# Patient Record
Sex: FEMALE | Race: WHITE | NOT HISPANIC OR LATINO | Employment: OTHER | URBAN - NONMETROPOLITAN AREA
[De-identification: names, ages, dates, MRNs, and addresses within clinical notes are randomized per-mention and may not be internally consistent; named-entity substitution may affect disease eponyms.]

---

## 2018-03-09 ENCOUNTER — TRANSCRIBE ORDERS (OUTPATIENT)
Dept: CARDIOLOGY | Facility: CLINIC | Age: 63
End: 2018-03-09

## 2018-03-09 DIAGNOSIS — I10 HYPERTENSION, UNSPECIFIED TYPE: ICD-10-CM

## 2018-03-09 DIAGNOSIS — R06.00 DYSPNEA, UNSPECIFIED TYPE: Primary | ICD-10-CM

## 2018-03-12 ENCOUNTER — CONSULT (OUTPATIENT)
Dept: CARDIOLOGY | Facility: CLINIC | Age: 63
End: 2018-03-12

## 2018-03-12 VITALS
SYSTOLIC BLOOD PRESSURE: 140 MMHG | HEIGHT: 66 IN | DIASTOLIC BLOOD PRESSURE: 90 MMHG | BODY MASS INDEX: 33.27 KG/M2 | WEIGHT: 207 LBS | HEART RATE: 80 BPM

## 2018-03-12 DIAGNOSIS — E78.00 HYPERCHOLESTEREMIA: ICD-10-CM

## 2018-03-12 DIAGNOSIS — R01.1 MURMUR, CARDIAC: ICD-10-CM

## 2018-03-12 DIAGNOSIS — R00.2 PALPITATIONS: ICD-10-CM

## 2018-03-12 DIAGNOSIS — I10 ESSENTIAL HYPERTENSION: ICD-10-CM

## 2018-03-12 DIAGNOSIS — R06.02 SHORTNESS OF BREATH: ICD-10-CM

## 2018-03-12 DIAGNOSIS — R07.2 PRECORDIAL PAIN: Primary | ICD-10-CM

## 2018-03-12 PROCEDURE — 99204 OFFICE O/P NEW MOD 45 MIN: CPT | Performed by: INTERNAL MEDICINE

## 2018-03-12 PROCEDURE — 93000 ELECTROCARDIOGRAM COMPLETE: CPT | Performed by: INTERNAL MEDICINE

## 2018-03-12 RX ORDER — LOSARTAN POTASSIUM 100 MG/1
100 TABLET ORAL DAILY
COMMUNITY
End: 2022-06-01

## 2018-03-12 RX ORDER — PANTOPRAZOLE SODIUM 40 MG/1
40 TABLET, DELAYED RELEASE ORAL DAILY
COMMUNITY

## 2018-03-12 RX ORDER — TRIAMTERENE AND HYDROCHLOROTHIAZIDE 37.5; 25 MG/1; MG/1
1 TABLET ORAL DAILY
COMMUNITY

## 2018-03-12 RX ORDER — GABAPENTIN 100 MG/1
100 CAPSULE ORAL NIGHTLY
Status: ON HOLD | COMMUNITY
End: 2022-05-12

## 2018-03-12 RX ORDER — AMLODIPINE BESYLATE 5 MG/1
5 TABLET ORAL DAILY
COMMUNITY
End: 2018-03-12

## 2018-03-12 RX ORDER — ONDANSETRON 4 MG/1
4 TABLET, FILM COATED ORAL EVERY 6 HOURS PRN
COMMUNITY

## 2018-03-12 RX ORDER — TRAMADOL HYDROCHLORIDE 50 MG/1
50 TABLET ORAL 3 TIMES DAILY
Status: ON HOLD | COMMUNITY
End: 2022-05-12

## 2018-03-12 RX ORDER — METOPROLOL TARTRATE 50 MG/1
50 TABLET, FILM COATED ORAL 2 TIMES DAILY
Qty: 60 TABLET | Refills: 11 | Status: SHIPPED | OUTPATIENT
Start: 2018-03-12

## 2018-03-12 RX ORDER — ERGOCALCIFEROL 1.25 MG/1
50000 CAPSULE ORAL WEEKLY
COMMUNITY

## 2018-03-12 RX ORDER — LORATADINE 10 MG/1
10 TABLET ORAL DAILY
COMMUNITY

## 2018-03-12 NOTE — PROGRESS NOTES
CARDIAC COMPLAINTS  chest pressure/discomfort, dyspnea and palpitations      Subjective   Raúl Vicente is a 62 y.o. female came in today for her initial cardiac evaluation.  She has history of hypertension, hypercholesterolemia was been having symptoms of palpitation for many years.  Few weeks ago, she was outside in her yard when she fell on her right side.  She doesn't think that she had any syncope.  She is not sure why she fell, whether she tripped on something or did she feel dizzy.  Since then, she has been noticing left-sided chest pain radiating to the left shoulder.  This pain is intermittent, occurs anytime of the day without any precipitating factors.  She also had history of pneumonia few months ago and since then noticed increasing shortness of breath.  Regarding the palpitation it is more of a skipped beating.  She also complain of having history of blood clots behind her left knee in the past.  Her lab work showed that the LDL is 113 and the TSH is normal.  She quit smoking in 1976 and heart disease runs in the family.    No past surgical history on file.    Current Outpatient Prescriptions   Medication Sig Dispense Refill   • FENOFIBRATE PO Take 160 mg by mouth Daily.     • fluticasone (FLOVENT DISKUS) 50 MCG/BLIST diskus inhaler Inhale 1 puff 2 (Two) Times a Day.     • gabapentin (NEURONTIN) 100 MG capsule Take 100 mg by mouth Every Night.     • loratadine (CLARITIN) 10 MG tablet Take 10 mg by mouth Daily.     • losartan (COZAAR) 100 MG tablet Take 100 mg by mouth Daily.     • mometasone-formoterol (DULERA 100) 100-5 MCG/ACT inhaler Inhale 2 puffs 2 (Two) Times a Day.     • ondansetron (ZOFRAN) 4 MG tablet Take 4 mg by mouth Every 6 (Six) Hours As Needed for Nausea or Vomiting.     • pantoprazole (PROTONIX) 40 MG EC tablet Take 40 mg by mouth Daily.     • traMADol (ULTRAM) 50 MG tablet Take 50 mg by mouth 3 (Three) Times a Day.     • triamterene-hydrochlorothiazide (MAXZIDE-25) 37.5-25 MG per  tablet Take 1 tablet by mouth Daily.     • vitamin D (ERGOCALCIFEROL) 80975 units capsule capsule Take 50,000 Units by mouth 1 (One) Time Per Week.     • metoprolol tartrate (LOPRESSOR) 50 MG tablet Take 1 tablet by mouth 2 (Two) Times a Day. 60 tablet 11     No current facility-administered medications for this visit.            ALLERGIES:  Demerol [meperidine] and Sulfa antibiotics    Past Medical History:   Diagnosis Date   • Asthma    • Cervical disc syndrome    • Chronic kidney disease     follows with Dr De Los Santos   • Deep vein thrombosis 2016   • Depression    • DJD (degenerative joint disease)    • History of repair of hiatal hernia 2016   • Hx of hysterectomy    • Hx of lumpectomy    • Hyperlipidemia    • Hypertension    • PUD (peptic ulcer disease)        History   Smoking Status   • Former Smoker   • Quit date: 3/12/1976   Smokeless Tobacco   • Never Used          Family History   Problem Relation Age of Onset   • No Known Problems Mother    • Heart disease Father    • Heart disease Brother    • Heart disease Maternal Grandmother    • Hypertension Son        Review of Systems   Constitution: Positive for malaise/fatigue. Negative for decreased appetite.   HENT: Negative for congestion and sore throat.    Eyes: Negative for blurred vision.   Cardiovascular: Positive for dyspnea on exertion and palpitations. Negative for chest pain.   Respiratory: Positive for shortness of breath. Negative for snoring.    Endocrine: Negative for cold intolerance and heat intolerance.   Hematologic/Lymphatic: Negative for adenopathy. Does not bruise/bleed easily.   Skin: Negative for itching, nail changes and skin cancer.   Musculoskeletal: Positive for arthritis and joint pain. Negative for myalgias.   Gastrointestinal: Negative for abdominal pain, dysphagia and heartburn.   Genitourinary: Negative for bladder incontinence and frequency.   Neurological: Negative for dizziness, light-headedness, seizures and vertigo.  "  Psychiatric/Behavioral: Negative for altered mental status.   Allergic/Immunologic: Negative for environmental allergies and hives.       Diabetes- No  Thyroid- normal    Objective     /90 (BP Location: Right arm)   Pulse 80   Ht 167.6 cm (66\")   Wt 93.9 kg (207 lb)   BMI 33.41 kg/m²     Physical Exam   Constitutional: She is oriented to person, place, and time. She appears well-developed and well-nourished.   HENT:   Head: Normocephalic.   Nose: Nose normal.   Eyes: EOM are normal. Pupils are equal, round, and reactive to light.   Neck: Normal range of motion. Neck supple.   Cardiovascular: Normal rate, regular rhythm, S1 normal and S2 normal.   Occasional extrasystoles are present.   No murmur heard.  Pulmonary/Chest: Effort normal and breath sounds normal.   Abdominal: Soft. Bowel sounds are normal.   Musculoskeletal: Normal range of motion. She exhibits no edema.   Neurological: She is alert and oriented to person, place, and time.   Skin: Skin is warm and dry.   Psychiatric: She has a normal mood and affect.         ECG 12 Lead  Date/Time: 3/12/2018 12:34 PM  Performed by: CONNER BRAVO  Authorized by: CONNER BRAVO   Previous ECG: no previous ECG available  Rhythm: sinus rhythm  Ectopy: PVCs  Rate: normal  QRS axis: normal  Clinical impression: non-specific ECG              Assessment/Plan     Raúl was seen today for establish care, chest pain, shortness of breath, palpitations and irregular heart beat.    Diagnoses and all orders for this visit:    Precordial pain  -     Treadmill Stress Test; Future    Essential hypertension  -     metoprolol tartrate (LOPRESSOR) 50 MG tablet; Take 1 tablet by mouth 2 (Two) Times a Day.  -     Treadmill Stress Test; Future    Hypercholesteremia    Shortness of breath  -     Adult Transthoracic Echo Complete W/ Cont if Necessary Per Protocol; Future    Palpitations  -     metoprolol tartrate (LOPRESSOR) 50 MG tablet; Take 1 tablet by mouth 2 (Two) " Times a Day.    Murmur, cardiac  -     Adult Transthoracic Echo Complete W/ Cont if Necessary Per Protocol; Future     At baseline her heart rate is stable but slightly irregular.  Blood pressure is mildly elevated.  Her BMI is 33.  Her EKG showed sinus rhythm with occasional PVC's.  Her clinical examination is unremarkable.  She does have some mild restriction of the movement in the left shoulder.  I did not notice any tenderness.  I started her on Lopressor 50 mg twice a day for her blood pressure and palpitation.  I talked to her about diet and weight reduction.  I scheduled her to undergo an echocardiogram to evaluate the LV function and the valvular structures.  I also scheduled her to undergo a regular stress test to evaluate the functional status, chronotropic response and to look for any stress-induced arrhythmia.  Based on the results of these tests, further recommendations will be made.               Electronically signed by Daovn Luther MD March 12, 2018 12:27 PM

## 2018-03-14 ENCOUNTER — HOSPITAL ENCOUNTER (OUTPATIENT)
Dept: CARDIOLOGY | Facility: HOSPITAL | Age: 63
Discharge: HOME | End: 2018-03-14
Attending: INTERNAL MEDICINE

## 2018-03-14 ENCOUNTER — HOSPITAL ENCOUNTER (OUTPATIENT)
Dept: CARDIOLOGY | Facility: HOSPITAL | Age: 63
Discharge: HOME | End: 2018-03-14
Attending: INTERNAL MEDICINE | Admitting: INTERNAL MEDICINE

## 2018-03-14 ENCOUNTER — OUTSIDE FACILITY SERVICE (OUTPATIENT)
Dept: CARDIOLOGY | Facility: CLINIC | Age: 63
End: 2018-03-14

## 2018-03-14 DIAGNOSIS — R06.02 SHORTNESS OF BREATH: ICD-10-CM

## 2018-03-14 DIAGNOSIS — I10 ESSENTIAL HYPERTENSION: ICD-10-CM

## 2018-03-14 DIAGNOSIS — R07.2 PRECORDIAL PAIN: ICD-10-CM

## 2018-03-14 DIAGNOSIS — R01.1 MURMUR, CARDIAC: ICD-10-CM

## 2018-03-14 LAB
MAXIMAL PREDICTED HEART RATE: 159 BPM
MAXIMAL PREDICTED HEART RATE: 159 BPM
STRESS TARGET HR: 135 BPM
STRESS TARGET HR: 135 BPM

## 2018-03-14 PROCEDURE — 93306 TTE W/DOPPLER COMPLETE: CPT

## 2018-03-14 PROCEDURE — 93306 TTE W/DOPPLER COMPLETE: CPT | Performed by: INTERNAL MEDICINE

## 2018-03-14 PROCEDURE — 93017 CV STRESS TEST TRACING ONLY: CPT

## 2018-03-14 PROCEDURE — 93018 CV STRESS TEST I&R ONLY: CPT | Performed by: INTERNAL MEDICINE

## 2018-03-16 ENCOUNTER — TELEPHONE (OUTPATIENT)
Dept: CARDIOLOGY | Facility: CLINIC | Age: 63
End: 2018-03-16

## 2018-03-20 DIAGNOSIS — R07.2 PRECORDIAL PAIN: Primary | ICD-10-CM

## 2018-03-20 NOTE — TELEPHONE ENCOUNTER
Patient's daughter, Jenifer aware of regular stress test and echo results and recommendations.    Inconclusive regular stress, recommendations may need nuclear stress test.    Patient is willing to proceed with nuclear stress test.  Can you place order please.

## 2018-03-27 ENCOUNTER — HOSPITAL ENCOUNTER (OUTPATIENT)
Dept: CARDIOLOGY | Facility: HOSPITAL | Age: 63
Discharge: HOME | End: 2018-03-27
Attending: INTERNAL MEDICINE

## 2018-03-27 ENCOUNTER — OUTSIDE FACILITY SERVICE (OUTPATIENT)
Dept: CARDIOLOGY | Facility: CLINIC | Age: 63
End: 2018-03-27

## 2018-03-27 DIAGNOSIS — R07.2 PRECORDIAL PAIN: ICD-10-CM

## 2018-03-27 LAB
MAXIMAL PREDICTED HEART RATE: 159 BPM
STRESS TARGET HR: 135 BPM

## 2018-03-27 PROCEDURE — 78452 HT MUSCLE IMAGE SPECT MULT: CPT | Performed by: INTERNAL MEDICINE

## 2018-03-27 PROCEDURE — 93017 CV STRESS TEST TRACING ONLY: CPT

## 2018-03-27 PROCEDURE — 78452 HT MUSCLE IMAGE SPECT MULT: CPT

## 2018-03-27 PROCEDURE — 93018 CV STRESS TEST I&R ONLY: CPT | Performed by: INTERNAL MEDICINE

## 2018-03-27 PROCEDURE — 25010000002 REGADENOSON 0.4 MG/5ML SOLUTION: Performed by: INTERNAL MEDICINE

## 2018-03-27 PROCEDURE — 0 TECHNETIUM SESTAMIBI: Performed by: INTERNAL MEDICINE

## 2018-03-27 PROCEDURE — A9500 TC99M SESTAMIBI: HCPCS | Performed by: INTERNAL MEDICINE

## 2018-03-27 RX ADMIN — TECHNETIUM TC 99M SESTAMIBI 1 DOSE: 1 INJECTION INTRAVENOUS at 09:30

## 2018-03-27 RX ADMIN — REGADENOSON 0.4 MG: 0.08 INJECTION, SOLUTION INTRAVENOUS at 09:30

## 2018-03-29 ENCOUNTER — TELEPHONE (OUTPATIENT)
Dept: CARDIOLOGY | Facility: CLINIC | Age: 63
End: 2018-03-29

## 2018-03-29 NOTE — TELEPHONE ENCOUNTER
Patient aware of stress test results and recommendations.  No obvious ischemia see, normal LV function.  Continue home medications.

## 2022-04-07 ENCOUNTER — TRANSCRIBE ORDERS (OUTPATIENT)
Dept: ADMINISTRATIVE | Facility: HOSPITAL | Age: 67
End: 2022-04-07

## 2022-04-07 DIAGNOSIS — R10.9 STOMACH ACHE: Primary | ICD-10-CM

## 2022-04-20 ENCOUNTER — HOSPITAL ENCOUNTER (OUTPATIENT)
Dept: ULTRASOUND IMAGING | Facility: HOSPITAL | Age: 67
Discharge: HOME | End: 2022-04-20
Admitting: NURSE PRACTITIONER

## 2022-04-20 DIAGNOSIS — R10.9 STOMACH ACHE: ICD-10-CM

## 2022-04-20 PROCEDURE — 76775 US EXAM ABDO BACK WALL LIM: CPT

## 2022-04-20 PROCEDURE — 76775 US EXAM ABDO BACK WALL LIM: CPT | Performed by: RADIOLOGY

## 2022-05-02 ENCOUNTER — OFFICE VISIT (OUTPATIENT)
Dept: SURGERY | Facility: CLINIC | Age: 67
End: 2022-05-02

## 2022-05-02 VITALS — BODY MASS INDEX: 32.62 KG/M2 | HEIGHT: 66 IN | WEIGHT: 203 LBS

## 2022-05-02 DIAGNOSIS — R10.12 LUQ ABDOMINAL PAIN: ICD-10-CM

## 2022-05-02 DIAGNOSIS — K80.20 GALLSTONES: Primary | ICD-10-CM

## 2022-05-02 DIAGNOSIS — R31.0 GROSS HEMATURIA: ICD-10-CM

## 2022-05-02 PROCEDURE — 99204 OFFICE O/P NEW MOD 45 MIN: CPT | Performed by: SURGERY

## 2022-05-02 RX ORDER — APIXABAN 5 MG/1
5 TABLET, FILM COATED ORAL 2 TIMES DAILY
COMMUNITY
Start: 2022-04-18

## 2022-05-02 RX ORDER — PEG-3350, SODIUM SULFATE, SODIUM CHLORIDE, POTASSIUM CHLORIDE, SODIUM ASCORBATE AND ASCORBIC ACID 7.5-2.691G
1000 KIT ORAL EVERY 12 HOURS
Qty: 4000 ML | Refills: 0 | Status: SHIPPED | OUTPATIENT
Start: 2022-05-02 | End: 2022-06-01

## 2022-05-02 NOTE — PROGRESS NOTES
Subjective   Raúl Vicente is a 66 y.o. female.     Chief Complaint: gallstones    History of Present Illness She is a 65 yo who had some abdominal pain in the LUQ and some blood in the urine at times. A US shows gallstones. No bowel symptoms, but she had polyps on a colonoscopy 6-7 years ago.     The following portions of the patient's history were reviewed and updated as appropriate: current medications, past family history, past medical history, past social history, past surgical history and problem list.    Review of Systems   Constitutional: Negative for activity change, appetite change, chills, fever and unexpected weight change.   HENT: Negative for congestion, facial swelling and sore throat.    Eyes: Negative for photophobia and visual disturbance.   Respiratory: Negative for chest tightness, shortness of breath and wheezing.    Cardiovascular: Negative for chest pain, palpitations and leg swelling.   Gastrointestinal: Positive for abdominal pain. Negative for abdominal distention, anal bleeding, blood in stool, constipation, diarrhea, nausea, rectal pain and vomiting.   Endocrine: Negative for cold intolerance, heat intolerance, polydipsia and polyuria.   Genitourinary: Positive for flank pain and hematuria. Negative for difficulty urinating, dysuria and urgency.   Musculoskeletal: Negative for back pain and myalgias.   Skin: Negative for rash and wound.   Allergic/Immunologic: Negative for immunocompromised state.   Neurological: Negative for dizziness, seizures, syncope, light-headedness, numbness and headaches.   Hematological: Negative for adenopathy. Does not bruise/bleed easily.   Psychiatric/Behavioral: Negative for behavioral problems and confusion. The patient is not nervous/anxious.        Objective   Physical Exam  Vitals reviewed.   Constitutional:       General: She is not in acute distress.     Appearance: She is well-developed. She is not ill-appearing.   HENT:      Head: Normocephalic. No  laceration. Hair is normal.      Right Ear: Hearing and ear canal normal.      Left Ear: Hearing and ear canal normal.      Nose: Nose normal.      Right Sinus: No maxillary sinus tenderness or frontal sinus tenderness.      Left Sinus: No maxillary sinus tenderness or frontal sinus tenderness.   Eyes:      General: Lids are normal.      Conjunctiva/sclera: Conjunctivae normal.      Pupils: Pupils are equal, round, and reactive to light.   Neck:      Thyroid: No thyroid mass or thyromegaly.      Vascular: No JVD.      Trachea: No tracheal tenderness or tracheal deviation.   Cardiovascular:      Rate and Rhythm: Normal rate and regular rhythm.      Heart sounds: No murmur heard.    No gallop.   Pulmonary:      Effort: Pulmonary effort is normal.      Breath sounds: Normal breath sounds. No stridor. No wheezing.   Chest:      Chest wall: No tenderness.   Breasts:      Right: No supraclavicular adenopathy.      Left: No supraclavicular adenopathy.       Abdominal:      General: Bowel sounds are normal. There is no distension.      Palpations: Abdomen is soft. There is no mass.      Tenderness: There is no abdominal tenderness. There is no guarding or rebound.      Hernia: No hernia is present.   Musculoskeletal:         General: No deformity.      Cervical back: Normal range of motion.   Lymphadenopathy:      Cervical: No cervical adenopathy.      Upper Body:      Right upper body: No supraclavicular adenopathy.      Left upper body: No supraclavicular adenopathy.   Skin:     General: Skin is warm and dry.      Coloration: Skin is not pale.      Findings: No erythema or rash.   Neurological:      Mental Status: She is alert and oriented to person, place, and time.      Motor: No abnormal muscle tone.   Psychiatric:         Behavior: Behavior normal.         Thought Content: Thought content normal.         Past Medical History:   Diagnosis Date   • Asthma    • Cervical disc syndrome    • Chronic kidney disease      follows with Dr De Los Santos   • Deep vein thrombosis (HCC) 2016   • Depression    • DJD (degenerative joint disease)    • History of repair of hiatal hernia 2016   • Hx of hysterectomy    • Hx of lumpectomy    • Hyperlipidemia    • Hypertension    • PUD (peptic ulcer disease)        Family History   Problem Relation Age of Onset   • No Known Problems Mother    • Heart disease Father    • Heart disease Brother    • Heart disease Maternal Grandmother    • Hypertension Son        Social History     Tobacco Use   • Smoking status: Former Smoker     Quit date: 3/12/1976     Years since quittin.1   • Smokeless tobacco: Never Used   Vaping Use   • Vaping Use: Never used   Substance Use Topics   • Alcohol use: No   • Drug use: No       Past Surgical History:   Procedure Laterality Date   • BREAST LUMPECTOMY     • CARDIOVASCULAR STRESS TEST  2018    R. Stress- 6 Min, 59 Secs. 5.2 METS. 64% THR. BP- 154/61. Inconclusive.   • CARDIOVASCULAR STRESS TEST  2018    L. Cardiolite- Negative. Breast attenuation   • ECHO - CONVERTED  2018    EF 65%. RVSP- 21 mmHg   • HERNIA REPAIR     • HYSTERECTOMY         Current Outpatient Medications   Medication Instructions   • Eliquis 5 mg, Oral, 2 Times Daily   • FENOFIBRATE  mg, Oral, Daily   • fluticasone (FLOVENT DISKUS) 50 MCG/BLIST diskus inhaler 1 puff, Inhalation, 2 Times Daily - RT   • gabapentin (NEURONTIN) 100 mg, Oral, Nightly   • loratadine (CLARITIN) 10 mg, Oral, Daily   • losartan (COZAAR) 100 mg, Oral, Daily   • metoprolol tartrate (LOPRESSOR) 50 mg, Oral, 2 Times Daily   • mometasone-formoterol (DULERA 100) 100-5 MCG/ACT inhaler 2 puffs, Inhalation, 2 Times Daily - RT   • ondansetron (ZOFRAN) 4 mg, Oral, Every 6 Hours PRN   • pantoprazole (PROTONIX) 40 mg, Oral, Daily   • traMADol (ULTRAM) 50 mg, Oral, 3 Times Daily   • triamterene-hydrochlorothiazide (MAXZIDE-25) 37.5-25 MG per tablet 1 tablet, Oral, Daily   • vitamin D (ERGOCALCIFEROL) 50,000 Units,  Oral, Weekly         Assessment/Plan   Diagnoses and all orders for this visit:    1. Gallstones (Primary)    2. LUQ abdominal pain    3. Gross hematuria    She will need a CT scan of the abdomen and colonoscopy as I do not think her symptoms are from the gallstones.

## 2022-05-02 NOTE — H&P (VIEW-ONLY)
Subjective   Raúl Vicente is a 66 y.o. female.     Chief Complaint: gallstones    History of Present Illness She is a 67 yo who had some abdominal pain in the LUQ and some blood in the urine at times. A US shows gallstones. No bowel symptoms, but she had polyps on a colonoscopy 6-7 years ago.     The following portions of the patient's history were reviewed and updated as appropriate: current medications, past family history, past medical history, past social history, past surgical history and problem list.    Review of Systems   Constitutional: Negative for activity change, appetite change, chills, fever and unexpected weight change.   HENT: Negative for congestion, facial swelling and sore throat.    Eyes: Negative for photophobia and visual disturbance.   Respiratory: Negative for chest tightness, shortness of breath and wheezing.    Cardiovascular: Negative for chest pain, palpitations and leg swelling.   Gastrointestinal: Positive for abdominal pain. Negative for abdominal distention, anal bleeding, blood in stool, constipation, diarrhea, nausea, rectal pain and vomiting.   Endocrine: Negative for cold intolerance, heat intolerance, polydipsia and polyuria.   Genitourinary: Positive for flank pain and hematuria. Negative for difficulty urinating, dysuria and urgency.   Musculoskeletal: Negative for back pain and myalgias.   Skin: Negative for rash and wound.   Allergic/Immunologic: Negative for immunocompromised state.   Neurological: Negative for dizziness, seizures, syncope, light-headedness, numbness and headaches.   Hematological: Negative for adenopathy. Does not bruise/bleed easily.   Psychiatric/Behavioral: Negative for behavioral problems and confusion. The patient is not nervous/anxious.        Objective   Physical Exam  Vitals reviewed.   Constitutional:       General: She is not in acute distress.     Appearance: She is well-developed. She is not ill-appearing.   HENT:      Head: Normocephalic. No  laceration. Hair is normal.      Right Ear: Hearing and ear canal normal.      Left Ear: Hearing and ear canal normal.      Nose: Nose normal.      Right Sinus: No maxillary sinus tenderness or frontal sinus tenderness.      Left Sinus: No maxillary sinus tenderness or frontal sinus tenderness.   Eyes:      General: Lids are normal.      Conjunctiva/sclera: Conjunctivae normal.      Pupils: Pupils are equal, round, and reactive to light.   Neck:      Thyroid: No thyroid mass or thyromegaly.      Vascular: No JVD.      Trachea: No tracheal tenderness or tracheal deviation.   Cardiovascular:      Rate and Rhythm: Normal rate and regular rhythm.      Heart sounds: No murmur heard.    No gallop.   Pulmonary:      Effort: Pulmonary effort is normal.      Breath sounds: Normal breath sounds. No stridor. No wheezing.   Chest:      Chest wall: No tenderness.   Breasts:      Right: No supraclavicular adenopathy.      Left: No supraclavicular adenopathy.       Abdominal:      General: Bowel sounds are normal. There is no distension.      Palpations: Abdomen is soft. There is no mass.      Tenderness: There is no abdominal tenderness. There is no guarding or rebound.      Hernia: No hernia is present.   Musculoskeletal:         General: No deformity.      Cervical back: Normal range of motion.   Lymphadenopathy:      Cervical: No cervical adenopathy.      Upper Body:      Right upper body: No supraclavicular adenopathy.      Left upper body: No supraclavicular adenopathy.   Skin:     General: Skin is warm and dry.      Coloration: Skin is not pale.      Findings: No erythema or rash.   Neurological:      Mental Status: She is alert and oriented to person, place, and time.      Motor: No abnormal muscle tone.   Psychiatric:         Behavior: Behavior normal.         Thought Content: Thought content normal.         Past Medical History:   Diagnosis Date   • Asthma    • Cervical disc syndrome    • Chronic kidney disease      follows with Dr De Los Santos   • Deep vein thrombosis (HCC) 2016   • Depression    • DJD (degenerative joint disease)    • History of repair of hiatal hernia 2016   • Hx of hysterectomy    • Hx of lumpectomy    • Hyperlipidemia    • Hypertension    • PUD (peptic ulcer disease)        Family History   Problem Relation Age of Onset   • No Known Problems Mother    • Heart disease Father    • Heart disease Brother    • Heart disease Maternal Grandmother    • Hypertension Son        Social History     Tobacco Use   • Smoking status: Former Smoker     Quit date: 3/12/1976     Years since quittin.1   • Smokeless tobacco: Never Used   Vaping Use   • Vaping Use: Never used   Substance Use Topics   • Alcohol use: No   • Drug use: No       Past Surgical History:   Procedure Laterality Date   • BREAST LUMPECTOMY     • CARDIOVASCULAR STRESS TEST  2018    R. Stress- 6 Min, 59 Secs. 5.2 METS. 64% THR. BP- 154/61. Inconclusive.   • CARDIOVASCULAR STRESS TEST  2018    L. Cardiolite- Negative. Breast attenuation   • ECHO - CONVERTED  2018    EF 65%. RVSP- 21 mmHg   • HERNIA REPAIR     • HYSTERECTOMY         Current Outpatient Medications   Medication Instructions   • Eliquis 5 mg, Oral, 2 Times Daily   • FENOFIBRATE  mg, Oral, Daily   • fluticasone (FLOVENT DISKUS) 50 MCG/BLIST diskus inhaler 1 puff, Inhalation, 2 Times Daily - RT   • gabapentin (NEURONTIN) 100 mg, Oral, Nightly   • loratadine (CLARITIN) 10 mg, Oral, Daily   • losartan (COZAAR) 100 mg, Oral, Daily   • metoprolol tartrate (LOPRESSOR) 50 mg, Oral, 2 Times Daily   • mometasone-formoterol (DULERA 100) 100-5 MCG/ACT inhaler 2 puffs, Inhalation, 2 Times Daily - RT   • ondansetron (ZOFRAN) 4 mg, Oral, Every 6 Hours PRN   • pantoprazole (PROTONIX) 40 mg, Oral, Daily   • traMADol (ULTRAM) 50 mg, Oral, 3 Times Daily   • triamterene-hydrochlorothiazide (MAXZIDE-25) 37.5-25 MG per tablet 1 tablet, Oral, Daily   • vitamin D (ERGOCALCIFEROL) 50,000 Units,  Oral, Weekly         Assessment/Plan   Diagnoses and all orders for this visit:    1. Gallstones (Primary)    2. LUQ abdominal pain    3. Gross hematuria    She will need a CT scan of the abdomen and colonoscopy as I do not think her symptoms are from the gallstones.

## 2022-05-09 ENCOUNTER — TELEPHONE (OUTPATIENT)
Dept: SURGERY | Facility: CLINIC | Age: 67
End: 2022-05-09

## 2022-05-09 ENCOUNTER — PREP FOR SURGERY (OUTPATIENT)
Dept: OTHER | Facility: HOSPITAL | Age: 67
End: 2022-05-09

## 2022-05-09 DIAGNOSIS — R10.12 LEFT UPPER QUADRANT ABDOMINAL PAIN: Primary | ICD-10-CM

## 2022-05-09 NOTE — TELEPHONE ENCOUNTER
[facility] Cov[facility] test scheduled Tuesday between the hours of 8 am - 4 pm (follow the orange arrows on hospital signs to testing site). Covid test MUST be done here, no outside test accepted!  Patient to follow clear liquid diet all day on Wednesday from the time they wake up until midnight. Diet list given to patient upon check out. Patient will also begin drinking bowel prep that evening beginning around 6 pm. Bowel prep directions provided at check out along with clear liquid diet list.   Patient needs to be NPO for surgery (no food or drinks after midnight the night before surgery or nothing morning of surgery). Patient also is required to have a  accompany them on procedure day. This person must remain on campus at all times either inside the hospital or in their car. They cannot drop you off and pick you up.  Procedure scheduled for Thursday 5/12 @ 7:30 am at Outpatient Surgery on the ground floor (opposite side of the ER and Main Entrance).

## 2022-05-10 ENCOUNTER — LAB (OUTPATIENT)
Dept: LAB | Facility: HOSPITAL | Age: 67
End: 2022-05-10

## 2022-05-10 DIAGNOSIS — R10.12 LEFT UPPER QUADRANT ABDOMINAL PAIN: ICD-10-CM

## 2022-05-10 LAB — SARS-COV-2 RNA PNL SPEC NAA+PROBE: NOT DETECTED

## 2022-05-10 PROCEDURE — U0004 COV-19 TEST NON-CDC HGH THRU: HCPCS | Performed by: SURGERY

## 2022-05-12 ENCOUNTER — ANESTHESIA (OUTPATIENT)
Dept: PERIOP | Facility: HOSPITAL | Age: 67
End: 2022-05-12

## 2022-05-12 ENCOUNTER — ANESTHESIA EVENT (OUTPATIENT)
Dept: PERIOP | Facility: HOSPITAL | Age: 67
End: 2022-05-12

## 2022-05-12 ENCOUNTER — HOSPITAL ENCOUNTER (OUTPATIENT)
Facility: HOSPITAL | Age: 67
Setting detail: HOSPITAL OUTPATIENT SURGERY
Discharge: HOME | End: 2022-05-12
Attending: SURGERY | Admitting: SURGERY

## 2022-05-12 VITALS
HEIGHT: 66 IN | RESPIRATION RATE: 20 BRPM | TEMPERATURE: 97 F | DIASTOLIC BLOOD PRESSURE: 74 MMHG | HEART RATE: 63 BPM | BODY MASS INDEX: 32.14 KG/M2 | WEIGHT: 200 LBS | SYSTOLIC BLOOD PRESSURE: 139 MMHG | OXYGEN SATURATION: 96 %

## 2022-05-12 PROCEDURE — 25010000002 ONDANSETRON PER 1 MG: Performed by: ANESTHESIOLOGY

## 2022-05-12 PROCEDURE — 25010000002 PROPOFOL 10 MG/ML EMULSION: Performed by: NURSE ANESTHETIST, CERTIFIED REGISTERED

## 2022-05-12 PROCEDURE — 0 LIDOCAINE 1 % SOLUTION: Performed by: NURSE ANESTHETIST, CERTIFIED REGISTERED

## 2022-05-12 PROCEDURE — 25010000002 MIDAZOLAM PER 1 MG: Performed by: NURSE ANESTHETIST, CERTIFIED REGISTERED

## 2022-05-12 PROCEDURE — 45378 DIAGNOSTIC COLONOSCOPY: CPT | Performed by: SURGERY

## 2022-05-12 RX ORDER — IPRATROPIUM BROMIDE AND ALBUTEROL SULFATE 2.5; .5 MG/3ML; MG/3ML
3 SOLUTION RESPIRATORY (INHALATION) ONCE AS NEEDED
Status: DISCONTINUED | OUTPATIENT
Start: 2022-05-12 | End: 2022-05-12 | Stop reason: HOSPADM

## 2022-05-12 RX ORDER — MIDAZOLAM HYDROCHLORIDE 1 MG/ML
1 INJECTION INTRAMUSCULAR; INTRAVENOUS
Status: DISCONTINUED | OUTPATIENT
Start: 2022-05-12 | End: 2022-05-12 | Stop reason: HOSPADM

## 2022-05-12 RX ORDER — SODIUM CHLORIDE 0.9 % (FLUSH) 0.9 %
10 SYRINGE (ML) INJECTION AS NEEDED
Status: DISCONTINUED | OUTPATIENT
Start: 2022-05-12 | End: 2022-05-12 | Stop reason: HOSPADM

## 2022-05-12 RX ORDER — PROPOFOL 10 MG/ML
VIAL (ML) INTRAVENOUS CONTINUOUS PRN
Status: DISCONTINUED | OUTPATIENT
Start: 2022-05-12 | End: 2022-05-12 | Stop reason: SURG

## 2022-05-12 RX ORDER — OXYCODONE HYDROCHLORIDE AND ACETAMINOPHEN 5; 325 MG/1; MG/1
1 TABLET ORAL ONCE AS NEEDED
Status: DISCONTINUED | OUTPATIENT
Start: 2022-05-12 | End: 2022-05-12 | Stop reason: HOSPADM

## 2022-05-12 RX ORDER — SODIUM CHLORIDE, SODIUM LACTATE, POTASSIUM CHLORIDE, CALCIUM CHLORIDE 600; 310; 30; 20 MG/100ML; MG/100ML; MG/100ML; MG/100ML
125 INJECTION, SOLUTION INTRAVENOUS ONCE
Status: COMPLETED | OUTPATIENT
Start: 2022-05-12 | End: 2022-05-12

## 2022-05-12 RX ORDER — HYDROCODONE BITARTRATE AND ACETAMINOPHEN 5; 325 MG/1; MG/1
1 TABLET ORAL EVERY 6 HOURS PRN
COMMUNITY

## 2022-05-12 RX ORDER — LIDOCAINE HYDROCHLORIDE 10 MG/ML
INJECTION, SOLUTION INFILTRATION; PERINEURAL AS NEEDED
Status: DISCONTINUED | OUTPATIENT
Start: 2022-05-12 | End: 2022-05-12 | Stop reason: SURG

## 2022-05-12 RX ORDER — SODIUM CHLORIDE 0.9 % (FLUSH) 0.9 %
10 SYRINGE (ML) INJECTION EVERY 12 HOURS SCHEDULED
Status: DISCONTINUED | OUTPATIENT
Start: 2022-05-12 | End: 2022-05-12 | Stop reason: HOSPADM

## 2022-05-12 RX ORDER — ONDANSETRON 2 MG/ML
4 INJECTION INTRAMUSCULAR; INTRAVENOUS AS NEEDED
Status: DISCONTINUED | OUTPATIENT
Start: 2022-05-12 | End: 2022-05-12 | Stop reason: HOSPADM

## 2022-05-12 RX ORDER — MIDAZOLAM HYDROCHLORIDE 1 MG/ML
0.5 INJECTION INTRAMUSCULAR; INTRAVENOUS
Status: DISCONTINUED | OUTPATIENT
Start: 2022-05-12 | End: 2022-05-12 | Stop reason: HOSPADM

## 2022-05-12 RX ORDER — ONDANSETRON 2 MG/ML
4 INJECTION INTRAMUSCULAR; INTRAVENOUS ONCE
Status: COMPLETED | OUTPATIENT
Start: 2022-05-12 | End: 2022-05-12

## 2022-05-12 RX ORDER — MIDAZOLAM HYDROCHLORIDE 1 MG/ML
INJECTION INTRAMUSCULAR; INTRAVENOUS AS NEEDED
Status: DISCONTINUED | OUTPATIENT
Start: 2022-05-12 | End: 2022-05-12 | Stop reason: SURG

## 2022-05-12 RX ORDER — SODIUM CHLORIDE, SODIUM LACTATE, POTASSIUM CHLORIDE, CALCIUM CHLORIDE 600; 310; 30; 20 MG/100ML; MG/100ML; MG/100ML; MG/100ML
100 INJECTION, SOLUTION INTRAVENOUS ONCE AS NEEDED
Status: DISCONTINUED | OUTPATIENT
Start: 2022-05-12 | End: 2022-05-12 | Stop reason: HOSPADM

## 2022-05-12 RX ADMIN — LIDOCAINE HYDROCHLORIDE 60 MG: 10 INJECTION, SOLUTION INFILTRATION; PERINEURAL at 08:46

## 2022-05-12 RX ADMIN — ONDANSETRON 4 MG: 2 INJECTION INTRAMUSCULAR; INTRAVENOUS at 07:55

## 2022-05-12 RX ADMIN — MIDAZOLAM 2 MG: 1 INJECTION INTRAMUSCULAR; INTRAVENOUS at 08:46

## 2022-05-12 RX ADMIN — PROPOFOL 100 MCG/KG/MIN: 10 INJECTION, EMULSION INTRAVENOUS at 08:48

## 2022-05-12 RX ADMIN — SODIUM CHLORIDE, POTASSIUM CHLORIDE, SODIUM LACTATE AND CALCIUM CHLORIDE: 600; 310; 30; 20 INJECTION, SOLUTION INTRAVENOUS at 08:43

## 2022-05-12 NOTE — ANESTHESIA POSTPROCEDURE EVALUATION
Patient: Raúl Vicente    Procedure Summary     Date: 05/12/22 Room / Location: [facility] OR  /  [facility] OR    Anesthesia Start: 0844 Anesthesia Stop: 0904    Procedure: COLONOSCOPY (N/A ) Diagnosis:       Left upper quadrant abdominal pain      (Left upper quadrant abdominal pain [R10.12])    Surgeons: Saul Maguire MD Provider: Kory Adan MD    Anesthesia Type: general ASA Status: 3          Anesthesia Type: general    Vitals  Vitals Value Taken Time   /73 05/12/22 0920   Temp 97 °F (36.1 °C) 05/12/22 0905   Pulse 61 05/12/22 0920   Resp 20 05/12/22 0920   SpO2 96 % 05/12/22 0920           Post Anesthesia Care and Evaluation    Patient location during evaluation: PACU  Patient participation: complete - patient participated  Level of consciousness: awake  Pain score: 0  Pain management: adequate  Airway patency: patent  Anesthetic complications: No anesthetic complications  PONV Status: none  Cardiovascular status: acceptable  Respiratory status: acceptable  Hydration status: acceptable      
oral

## 2022-05-12 NOTE — OP NOTE
COLONOSCOPY  Procedure Note    Raúl Vicente  5/12/2022    Pre-op Diagnosis:   Left upper quadrant abdominal pain [R10.12]    Post-op Diagnosis:  Same, diverticulosis in sigmoid       Procedure(s):  COLONOSCOPY    Surgeon(s):  Saul Maguire MD    Anesthesia: General    Staff:   Circulator: Marla Liz RN  Endo Technician: London Nagy    Estimated Blood Loss: none    Specimens:                * No orders in the log *      Drains: * No LDAs found *    Procedure: The scope advanced slowly from the rectum to the cecum. The ileocecal valve was unremarkable as was the terminal ileum. The scope was slowly with drawn. She had no polyps or inflammation. There was diverticulosis in the sigmoid and distal descending colon, but no inflammation. She also had some hemorrhoids.    Findings:  diverticulosis    Complications: none   Grafts / Implants N/A    Saul Maguire MD     Date: 5/12/2022  Time: 09:11 EDT

## 2022-05-12 NOTE — ANESTHESIA PREPROCEDURE EVALUATION
Anesthesia Evaluation     Patient summary reviewed and Nursing notes reviewed   no history of anesthetic complications:  NPO Solid Status: > 8 hours  NPO Liquid Status: > 8 hours           Airway   Mallampati: II  TM distance: >3 FB  Neck ROM: full  Dental    (+) poor dentition        Pulmonary     breath sounds clear to auscultation  (+) asthma,shortness of breath,   Cardiovascular   Exercise tolerance: good (4-7 METS)    Rhythm: regular  Rate: normal    (+) hypertension, valvular problems/murmurs, DVT (on eliquis) resolved, hyperlipidemia,       Neuro/Psych  (+) psychiatric history Anxiety,    GI/Hepatic/Renal/Endo    (+) obesity,  PUD,  renal disease,     Musculoskeletal     Abdominal     Abdomen: soft.   Substance History      OB/GYN          Other   arthritis,                      Anesthesia Plan    ASA 3     general     intravenous induction     Anesthetic plan, all risks, benefits, and alternatives have been provided, discussed and informed consent has been obtained with: patient.  Use of blood products discussed with consented to blood products.   Plan discussed with CRNA.        CODE STATUS:

## 2022-05-19 ENCOUNTER — OFFICE VISIT (OUTPATIENT)
Dept: SURGERY | Facility: CLINIC | Age: 67
End: 2022-05-19

## 2022-05-19 VITALS — HEIGHT: 66 IN | BODY MASS INDEX: 32.14 KG/M2 | WEIGHT: 200 LBS

## 2022-05-19 DIAGNOSIS — K80.20 GALLSTONES: ICD-10-CM

## 2022-05-19 DIAGNOSIS — R10.12 LUQ ABDOMINAL PAIN: Primary | ICD-10-CM

## 2022-05-19 PROCEDURE — 99214 OFFICE O/P EST MOD 30 MIN: CPT | Performed by: SURGERY

## 2022-05-19 NOTE — PROGRESS NOTES
Subjective   Raúl Vicetne is a 66 y.o. female.     Chief Complaint: LUQ pain    History of Present Illness She is a 65 yo who had LUQ pains. Her US showed gallstones but she had no pain on the right. Her colonoscopy showed only some diverticulosis in the sigmoid mostly. She was to have a CT but it has not been done yet. She does also have some discomfort on the right lower rib area if she lays on her right side.     The following portions of the patient's history were reviewed and updated as appropriate: current medications, past family history, past medical history, past social history, past surgical history and problem list.    Review of Systems   Constitutional: Negative for activity change, appetite change, chills, fever and unexpected weight change.   HENT: Negative for congestion, facial swelling and sore throat.    Eyes: Negative for photophobia and visual disturbance.   Respiratory: Negative for chest tightness, shortness of breath and wheezing.    Cardiovascular: Negative for chest pain, palpitations and leg swelling.   Gastrointestinal: Positive for abdominal pain. Negative for abdominal distention, anal bleeding, blood in stool, constipation, diarrhea, nausea, rectal pain and vomiting.   Endocrine: Negative for cold intolerance, heat intolerance, polydipsia and polyuria.   Genitourinary: Positive for flank pain and hematuria. Negative for difficulty urinating, dysuria and urgency.   Musculoskeletal: Negative for back pain and myalgias.   Skin: Negative for rash and wound.   Allergic/Immunologic: Negative for immunocompromised state.   Neurological: Negative for dizziness, seizures, syncope, light-headedness, numbness and headaches.   Hematological: Negative for adenopathy. Does not bruise/bleed easily.   Psychiatric/Behavioral: Negative for behavioral problems and confusion. The patient is not nervous/anxious.        Objective   Physical Exam  Vitals reviewed.   Constitutional:       General: She is not in  acute distress.     Appearance: She is well-developed. She is not ill-appearing.   HENT:      Head: Normocephalic. No laceration. Hair is normal.      Right Ear: Hearing and ear canal normal.      Left Ear: Hearing and ear canal normal.      Nose: Nose normal.      Right Sinus: No maxillary sinus tenderness or frontal sinus tenderness.      Left Sinus: No maxillary sinus tenderness or frontal sinus tenderness.   Eyes:      General: Lids are normal.      Conjunctiva/sclera: Conjunctivae normal.      Pupils: Pupils are equal, round, and reactive to light.   Neck:      Thyroid: No thyroid mass or thyromegaly.      Vascular: No JVD.      Trachea: No tracheal tenderness or tracheal deviation.   Cardiovascular:      Rate and Rhythm: Normal rate and regular rhythm.      Heart sounds: No murmur heard.    No gallop.   Pulmonary:      Effort: Pulmonary effort is normal.      Breath sounds: Normal breath sounds. No stridor. No wheezing.   Chest:      Chest wall: No tenderness.   Breasts:      Right: No supraclavicular adenopathy.      Left: No supraclavicular adenopathy.       Abdominal:      General: Bowel sounds are normal. There is no distension.      Palpations: Abdomen is soft. There is no mass.      Tenderness: There is no abdominal tenderness. There is no guarding or rebound.      Hernia: No hernia is present.   Musculoskeletal:         General: No deformity.      Cervical back: Normal range of motion.   Lymphadenopathy:      Cervical: No cervical adenopathy.      Upper Body:      Right upper body: No supraclavicular adenopathy.      Left upper body: No supraclavicular adenopathy.   Skin:     General: Skin is warm and dry.      Coloration: Skin is not pale.      Findings: No erythema or rash.   Neurological:      Mental Status: She is alert and oriented to person, place, and time.      Motor: No abnormal muscle tone.   Psychiatric:         Behavior: Behavior normal.         Thought Content: Thought content normal.          Past Medical History:   Diagnosis Date   • Asthma    • Cervical disc syndrome    • Chronic kidney disease     follows with Dr De Los Santos   • Coronary artery disease    • Deep vein thrombosis (HCC) 2016   • Depression    • DJD (degenerative joint disease)    • Elevated cholesterol    • GERD (gastroesophageal reflux disease)    • History of repair of hiatal hernia 2016   • Hx of hysterectomy    • Hx of lumpectomy    • Hyperlipidemia    • Hypertension    • PUD (peptic ulcer disease)        Family History   Problem Relation Age of Onset   • No Known Problems Mother    • Heart disease Father    • Heart disease Brother    • Heart disease Maternal Grandmother    • Hypertension Son        Social History     Tobacco Use   • Smoking status: Former Smoker     Quit date: 3/12/1976     Years since quittin.2   • Smokeless tobacco: Never Used   Vaping Use   • Vaping Use: Never used   Substance Use Topics   • Alcohol use: No   • Drug use: No       Past Surgical History:   Procedure Laterality Date   • BREAST LUMPECTOMY     • CARDIOVASCULAR STRESS TEST  2018    R. Stress- 6 Min, 59 Secs. 5.2 METS. 64% THR. BP- 154/61. Inconclusive.   • CARDIOVASCULAR STRESS TEST  2018    L. Cardiolite- Negative. Breast attenuation   • COLONOSCOPY N/A 2022    Procedure: COLONOSCOPY;  Surgeon: Saul Maguire MD;  Location: [facility];  Service: Gastroenterology;  Laterality: N/A;   • ECHO - CONVERTED  2018    EF 65%. RVSP- 21 mmHg   • HERNIA REPAIR     • HYSTERECTOMY         Current Outpatient Medications   Medication Instructions   • Eliquis 5 mg, Oral, 2 Times Daily   • FENOFIBRATE  mg, Oral, Daily   • fluticasone (FLOVENT DISKUS) 50 MCG/BLIST diskus inhaler 1 puff, Inhalation, 2 Times Daily - RT   • HYDROcodone-acetaminophen (NORCO) 5-325 MG per tablet 1 tablet, Oral, Every 6 Hours PRN   • loratadine (CLARITIN) 10 mg, Oral, Daily   • losartan (COZAAR) 100 mg, Oral, Daily   • metoprolol tartrate (LOPRESSOR) 50  mg, Oral, 2 Times Daily   • mometasone-formoterol (DULERA 100) 100-5 MCG/ACT inhaler 2 puffs, Inhalation, 2 Times Daily - RT   • ondansetron (ZOFRAN) 4 mg, Oral, Every 6 Hours PRN   • pantoprazole (PROTONIX) 40 mg, Oral, Daily   • PEG-KCl-NaCl-NaSulf-Na Asc-C (MoviPrep) 100 g reconstituted solution powder 1,000 mL, Oral, Every 12 Hours   • triamterene-hydrochlorothiazide (MAXZIDE-25) 37.5-25 MG per tablet 1 tablet, Oral, Daily   • vitamin D (ERGOCALCIFEROL) 50,000 Units, Oral, Weekly         Assessment & Plan   Diagnoses and all orders for this visit:    1. LUQ abdominal pain (Primary)    follow up on CT  Schedule filippo escalante

## 2022-05-27 ENCOUNTER — TELEPHONE (OUTPATIENT)
Dept: SURGERY | Facility: CLINIC | Age: 67
End: 2022-05-27

## 2022-05-27 ENCOUNTER — HOSPITAL ENCOUNTER (OUTPATIENT)
Dept: CT IMAGING | Facility: HOSPITAL | Age: 67
Discharge: HOME | End: 2022-05-27
Admitting: SURGERY

## 2022-05-27 DIAGNOSIS — R10.12 LUQ ABDOMINAL PAIN: ICD-10-CM

## 2022-05-27 DIAGNOSIS — R31.0 GROSS HEMATURIA: ICD-10-CM

## 2022-05-27 DIAGNOSIS — K80.20 GALLSTONES: ICD-10-CM

## 2022-05-27 LAB — CREAT BLDA-MCNC: 0.7 MG/DL (ref 0.6–1.3)

## 2022-05-27 PROCEDURE — 74178 CT ABD&PLV WO CNTR FLWD CNTR: CPT | Performed by: RADIOLOGY

## 2022-05-27 PROCEDURE — 25010000002 IOPAMIDOL 61 % SOLUTION: Performed by: SURGERY

## 2022-05-27 PROCEDURE — 82565 ASSAY OF CREATININE: CPT

## 2022-05-27 PROCEDURE — 74178 CT ABD&PLV WO CNTR FLWD CNTR: CPT

## 2022-05-27 RX ADMIN — IOPAMIDOL 80 ML: 612 INJECTION, SOLUTION INTRAVENOUS at 10:09

## 2022-05-27 NOTE — TELEPHONE ENCOUNTER
PAT and Covid test Wed 6/1/22 @ 2:15 pm  Surgery Friday 6/3/22 @ 8 am  Patient to be NPO and have a  with her.  Stop blood thinners prior per doctor's directions

## 2022-06-01 ENCOUNTER — PRE-ADMISSION TESTING (OUTPATIENT)
Dept: PREADMISSION TESTING | Facility: HOSPITAL | Age: 67
End: 2022-06-01

## 2022-06-01 ENCOUNTER — LAB (OUTPATIENT)
Dept: LAB | Facility: HOSPITAL | Age: 67
End: 2022-06-01

## 2022-06-01 DIAGNOSIS — K80.20 GALLSTONES: ICD-10-CM

## 2022-06-01 LAB
ANION GAP SERPL CALCULATED.3IONS-SCNC: 10.1 MMOL/L (ref 5–15)
BUN SERPL-MCNC: 20 MG/DL (ref 8–23)
BUN/CREAT SERPL: 24.4 (ref 7–25)
CALCIUM SPEC-SCNC: 10.1 MG/DL (ref 8.6–10.5)
CHLORIDE SERPL-SCNC: 104 MMOL/L (ref 98–107)
CO2 SERPL-SCNC: 28.9 MMOL/L (ref 22–29)
CREAT SERPL-MCNC: 0.82 MG/DL (ref 0.57–1)
DEPRECATED RDW RBC AUTO: 45.9 FL (ref 37–54)
EGFRCR SERPLBLD CKD-EPI 2021: 79.5 ML/MIN/1.73
ERYTHROCYTE [DISTWIDTH] IN BLOOD BY AUTOMATED COUNT: 13.4 % (ref 12.3–15.4)
GLUCOSE SERPL-MCNC: 103 MG/DL (ref 65–99)
HCT VFR BLD AUTO: 46.5 % (ref 34–46.6)
HGB BLD-MCNC: 15.7 G/DL (ref 12–15.9)
MCH RBC QN AUTO: 31.2 PG (ref 26.6–33)
MCHC RBC AUTO-ENTMCNC: 33.8 G/DL (ref 31.5–35.7)
MCV RBC AUTO: 92.3 FL (ref 79–97)
PLATELET # BLD AUTO: 212 10*3/MM3 (ref 140–450)
PMV BLD AUTO: 11.5 FL (ref 6–12)
POTASSIUM SERPL-SCNC: 4.6 MMOL/L (ref 3.5–5.2)
RBC # BLD AUTO: 5.04 10*6/MM3 (ref 3.77–5.28)
SODIUM SERPL-SCNC: 143 MMOL/L (ref 136–145)
WBC NRBC COR # BLD: 6.7 10*3/MM3 (ref 3.4–10.8)

## 2022-06-01 PROCEDURE — 36415 COLL VENOUS BLD VENIPUNCTURE: CPT

## 2022-06-01 PROCEDURE — 85027 COMPLETE CBC AUTOMATED: CPT

## 2022-06-01 PROCEDURE — 93010 ELECTROCARDIOGRAM REPORT: CPT | Performed by: INTERNAL MEDICINE

## 2022-06-01 PROCEDURE — 93005 ELECTROCARDIOGRAM TRACING: CPT

## 2022-06-01 PROCEDURE — 80048 BASIC METABOLIC PNL TOTAL CA: CPT

## 2022-06-01 PROCEDURE — U0004 COV-19 TEST NON-CDC HGH THRU: HCPCS

## 2022-06-01 PROCEDURE — C9803 HOPD COVID-19 SPEC COLLECT: HCPCS

## 2022-06-01 NOTE — DISCHARGE INSTRUCTIONS
6/03/22  0800  ARRIVAL TIME    TAKE the following medications the morning of surgery:  All heart or blood pressure medications    HOLD all diabetic medications the morning of surgery as ordered by physician.    Please discontinue all blood thinners and anticoagulants (except aspirin) prior to surgery as per your surgeon and cardiologist instructions.  Aspirin may be continued up to the day prior to surgery.     CHLORHEXIDINE CLOTHS GIVEN WITH INSTRUCTIONS AND FORM TO RETURN TO HOSPITAL, IF APPLICABLE.    General Instructions:  Do not eat or drink after midnight: includes water, mints, or gum. You may brush your teeth.  Dental appliances that are removable must be taken out day of surgery.  Do not smoke, chew tobacco, or drink alcohol.  Bring medications in original bottles, any inhalers and if applicable your C-PAP/BI-PAP machine.  Bring any papers given to you in the doctor's office.  Wear clean comfortable clothes and socks.  Do not wear contact lenses or make-up. Bring a case for your glasses if applicable.  Bring crutches or walker if applicable.  Leave all other valuables and jewelry at home.    If you were given a blood bank ID arm band remember to bring it with you the day of surgery.    Preventing a Surgical Site Infection:  Shower the night before surgery (unless instructed other wise) using a fresh bar of anti-bacterial soap (such as Dial) and clean washcloth. Dry with a clean towel and dress in clean clothing.  For 2 to 3 days before surgery, avoid shaving with a razor near where you will have surgery because the razor can irritate skin and make it easier to develop an infection. Ask your surgeon if you will be receiving antibiotics prior to surgery.  Make sure you, your family, and all healthcare providers clear their hands with soap and water or an alcohol-based hand  before caring for you or your wound.  If at all possible, quit smoking as many days before surgery as you can.    Day of  surgery:  Upon arrival, a Pre-op nurse and Anesthesiologist will review your health history, obtain vital signs, and answer questions you may have. The only belongings needed at this time will be your home medications and if applicable your C-PAP/BI-PAP machine. If you are staying overnight your family can leave the rest of your belongings in the car and bring them to your room later. A Pre-op nurse will start an IV and you may receive medication in preparation for surgery, including something to help you relax. Your family will be able to see you in the Pre-op area. While you are in surgery your family should notify the waiting room  if they leave the waiting room area and provide a contact phone number.    Please be aware that surgery does come with discomfort. We want to make every effort to control your discomfort so please discuss any uncontrolled symptoms with your nurse. Your doctor will most likely have prescribed pain medications.    If you are going home after surgery you will receive individualized written care instructions before being discharged. A responsible adult must drive you to and from the hospital on the day of surgery and stay with you for 24 hours.    If you are staying overnight following surgery, you will be transported to your hospital room following the recovery period.  [facility] has all private rooms.    If you have any questions please call Pre-Admission Testing at [phone].  Deductibles and co-payments are collected on the day of service. Please be prepared to pay the required co-pay, deductible or deposit on the day of service as defined by your plan.    A RESPONSIBLE PERSON MUST REMAIN IN THE WAITING ROOM DURING YOUR PROCEDURE AND A RESPONSIBLE  MUST BE AVAILABLE UPON YOUR DISCHARGE.

## 2022-06-02 LAB
QT INTERVAL: 426 MS
QTC INTERVAL: 411 MS
SARS-COV-2 RNA PNL SPEC NAA+PROBE: NOT DETECTED

## 2022-06-03 ENCOUNTER — HOSPITAL ENCOUNTER (OUTPATIENT)
Facility: HOSPITAL | Age: 67
Setting detail: HOSPITAL OUTPATIENT SURGERY
Discharge: HOME | End: 2022-06-03
Attending: SURGERY | Admitting: SURGERY

## 2022-06-03 ENCOUNTER — ANESTHESIA (OUTPATIENT)
Dept: PERIOP | Facility: HOSPITAL | Age: 67
End: 2022-06-03

## 2022-06-03 ENCOUNTER — ANESTHESIA EVENT (OUTPATIENT)
Dept: PERIOP | Facility: HOSPITAL | Age: 67
End: 2022-06-03

## 2022-06-03 ENCOUNTER — APPOINTMENT (OUTPATIENT)
Dept: GENERAL RADIOLOGY | Facility: HOSPITAL | Age: 67
End: 2022-06-03

## 2022-06-03 VITALS
HEIGHT: 66 IN | RESPIRATION RATE: 12 BRPM | OXYGEN SATURATION: 96 % | DIASTOLIC BLOOD PRESSURE: 72 MMHG | BODY MASS INDEX: 31.76 KG/M2 | TEMPERATURE: 97.8 F | HEART RATE: 60 BPM | SYSTOLIC BLOOD PRESSURE: 145 MMHG | WEIGHT: 197.6 LBS

## 2022-06-03 DIAGNOSIS — K80.20 GALLSTONES: ICD-10-CM

## 2022-06-03 PROCEDURE — 47562 LAPAROSCOPIC CHOLECYSTECTOMY: CPT | Performed by: SURGERY

## 2022-06-03 PROCEDURE — 25010000002 PROPOFOL 10 MG/ML EMULSION: Performed by: NURSE ANESTHETIST, CERTIFIED REGISTERED

## 2022-06-03 PROCEDURE — 25010000002 FENTANYL CITRATE (PF) 50 MCG/ML SOLUTION: Performed by: NURSE ANESTHETIST, CERTIFIED REGISTERED

## 2022-06-03 PROCEDURE — 25010000002 NEOSTIGMINE 10 MG/10ML SOLUTION: Performed by: NURSE ANESTHETIST, CERTIFIED REGISTERED

## 2022-06-03 PROCEDURE — 25010000002 MIDAZOLAM PER 1 MG: Performed by: NURSE ANESTHETIST, CERTIFIED REGISTERED

## 2022-06-03 PROCEDURE — 88304 TISSUE EXAM BY PATHOLOGIST: CPT

## 2022-06-03 PROCEDURE — 0 LIDOCAINE 1 % SOLUTION: Performed by: NURSE ANESTHETIST, CERTIFIED REGISTERED

## 2022-06-03 DEVICE — LIGACLIP 10-M/L, 10MM ENDOSCOPIC ROTATING MULTIPLE CLIP APPLIERS
Type: IMPLANTABLE DEVICE | Site: ABDOMEN | Status: FUNCTIONAL
Brand: LIGACLIP

## 2022-06-03 RX ORDER — BUPIVACAINE HYDROCHLORIDE AND EPINEPHRINE 5; 5 MG/ML; UG/ML
INJECTION, SOLUTION PERINEURAL AS NEEDED
Status: DISCONTINUED | OUTPATIENT
Start: 2022-06-03 | End: 2022-06-03 | Stop reason: HOSPADM

## 2022-06-03 RX ORDER — NEOSTIGMINE METHYLSULFATE 1 MG/ML
INJECTION, SOLUTION INTRAVENOUS AS NEEDED
Status: DISCONTINUED | OUTPATIENT
Start: 2022-06-03 | End: 2022-06-03 | Stop reason: SURG

## 2022-06-03 RX ORDER — FENTANYL CITRATE 50 UG/ML
50 INJECTION, SOLUTION INTRAMUSCULAR; INTRAVENOUS
Status: DISCONTINUED | OUTPATIENT
Start: 2022-06-03 | End: 2022-06-03 | Stop reason: HOSPADM

## 2022-06-03 RX ORDER — PROPOFOL 10 MG/ML
VIAL (ML) INTRAVENOUS AS NEEDED
Status: DISCONTINUED | OUTPATIENT
Start: 2022-06-03 | End: 2022-06-03 | Stop reason: SURG

## 2022-06-03 RX ORDER — FAMOTIDINE 10 MG/ML
INJECTION, SOLUTION INTRAVENOUS AS NEEDED
Status: DISCONTINUED | OUTPATIENT
Start: 2022-06-03 | End: 2022-06-03 | Stop reason: SURG

## 2022-06-03 RX ORDER — GLYCOPYRROLATE 0.2 MG/ML
INJECTION INTRAMUSCULAR; INTRAVENOUS AS NEEDED
Status: DISCONTINUED | OUTPATIENT
Start: 2022-06-03 | End: 2022-06-03 | Stop reason: SURG

## 2022-06-03 RX ORDER — MIDAZOLAM HYDROCHLORIDE 1 MG/ML
1 INJECTION INTRAMUSCULAR; INTRAVENOUS
Status: DISCONTINUED | OUTPATIENT
Start: 2022-06-03 | End: 2022-06-03 | Stop reason: HOSPADM

## 2022-06-03 RX ORDER — SODIUM CHLORIDE 9 MG/ML
INJECTION, SOLUTION INTRAVENOUS AS NEEDED
Status: DISCONTINUED | OUTPATIENT
Start: 2022-06-03 | End: 2022-06-03 | Stop reason: HOSPADM

## 2022-06-03 RX ORDER — SODIUM CHLORIDE 0.9 % (FLUSH) 0.9 %
10 SYRINGE (ML) INJECTION EVERY 12 HOURS SCHEDULED
Status: DISCONTINUED | OUTPATIENT
Start: 2022-06-03 | End: 2022-06-03 | Stop reason: HOSPADM

## 2022-06-03 RX ORDER — IPRATROPIUM BROMIDE AND ALBUTEROL SULFATE 2.5; .5 MG/3ML; MG/3ML
3 SOLUTION RESPIRATORY (INHALATION) ONCE AS NEEDED
Status: DISCONTINUED | OUTPATIENT
Start: 2022-06-03 | End: 2022-06-03 | Stop reason: HOSPADM

## 2022-06-03 RX ORDER — SODIUM CHLORIDE, SODIUM LACTATE, POTASSIUM CHLORIDE, CALCIUM CHLORIDE 600; 310; 30; 20 MG/100ML; MG/100ML; MG/100ML; MG/100ML
100 INJECTION, SOLUTION INTRAVENOUS ONCE AS NEEDED
Status: DISCONTINUED | OUTPATIENT
Start: 2022-06-03 | End: 2022-06-03 | Stop reason: HOSPADM

## 2022-06-03 RX ORDER — SODIUM CHLORIDE 0.9 % (FLUSH) 0.9 %
10 SYRINGE (ML) INJECTION AS NEEDED
Status: DISCONTINUED | OUTPATIENT
Start: 2022-06-03 | End: 2022-06-03 | Stop reason: HOSPADM

## 2022-06-03 RX ORDER — KETOROLAC TROMETHAMINE 30 MG/ML
30 INJECTION, SOLUTION INTRAMUSCULAR; INTRAVENOUS EVERY 6 HOURS PRN
Status: DISCONTINUED | OUTPATIENT
Start: 2022-06-03 | End: 2022-06-03 | Stop reason: HOSPADM

## 2022-06-03 RX ORDER — SCOLOPAMINE TRANSDERMAL SYSTEM 1 MG/1
PATCH, EXTENDED RELEASE TRANSDERMAL AS NEEDED
Status: DISCONTINUED | OUTPATIENT
Start: 2022-06-03 | End: 2022-06-03 | Stop reason: SURG

## 2022-06-03 RX ORDER — SODIUM CHLORIDE, SODIUM LACTATE, POTASSIUM CHLORIDE, CALCIUM CHLORIDE 600; 310; 30; 20 MG/100ML; MG/100ML; MG/100ML; MG/100ML
125 INJECTION, SOLUTION INTRAVENOUS ONCE
Status: COMPLETED | OUTPATIENT
Start: 2022-06-03 | End: 2022-06-03

## 2022-06-03 RX ORDER — HYDROCODONE BITARTRATE AND ACETAMINOPHEN 5; 325 MG/1; MG/1
1 TABLET ORAL EVERY 4 HOURS PRN
Status: DISCONTINUED | OUTPATIENT
Start: 2022-06-03 | End: 2022-06-03 | Stop reason: HOSPADM

## 2022-06-03 RX ORDER — ROCURONIUM BROMIDE 10 MG/ML
INJECTION, SOLUTION INTRAVENOUS AS NEEDED
Status: DISCONTINUED | OUTPATIENT
Start: 2022-06-03 | End: 2022-06-03 | Stop reason: SURG

## 2022-06-03 RX ORDER — MIDAZOLAM HYDROCHLORIDE 1 MG/ML
INJECTION INTRAMUSCULAR; INTRAVENOUS AS NEEDED
Status: DISCONTINUED | OUTPATIENT
Start: 2022-06-03 | End: 2022-06-03 | Stop reason: SURG

## 2022-06-03 RX ORDER — SODIUM CHLORIDE, SODIUM LACTATE, POTASSIUM CHLORIDE, CALCIUM CHLORIDE 600; 310; 30; 20 MG/100ML; MG/100ML; MG/100ML; MG/100ML
INJECTION, SOLUTION INTRAVENOUS CONTINUOUS PRN
Status: DISCONTINUED | OUTPATIENT
Start: 2022-06-03 | End: 2022-06-03 | Stop reason: SURG

## 2022-06-03 RX ORDER — MIDAZOLAM HYDROCHLORIDE 1 MG/ML
0.5 INJECTION INTRAMUSCULAR; INTRAVENOUS
Status: DISCONTINUED | OUTPATIENT
Start: 2022-06-03 | End: 2022-06-03 | Stop reason: HOSPADM

## 2022-06-03 RX ORDER — OXYCODONE HYDROCHLORIDE AND ACETAMINOPHEN 5; 325 MG/1; MG/1
1 TABLET ORAL ONCE AS NEEDED
Status: DISCONTINUED | OUTPATIENT
Start: 2022-06-03 | End: 2022-06-03 | Stop reason: HOSPADM

## 2022-06-03 RX ORDER — FENTANYL CITRATE 50 UG/ML
INJECTION, SOLUTION INTRAMUSCULAR; INTRAVENOUS AS NEEDED
Status: DISCONTINUED | OUTPATIENT
Start: 2022-06-03 | End: 2022-06-03 | Stop reason: SURG

## 2022-06-03 RX ORDER — ONDANSETRON 2 MG/ML
4 INJECTION INTRAMUSCULAR; INTRAVENOUS AS NEEDED
Status: DISCONTINUED | OUTPATIENT
Start: 2022-06-03 | End: 2022-06-03 | Stop reason: HOSPADM

## 2022-06-03 RX ORDER — HYDROCODONE BITARTRATE AND ACETAMINOPHEN 5; 325 MG/1; MG/1
1 TABLET ORAL EVERY 4 HOURS PRN
Qty: 12 TABLET | Refills: 0 | Status: SHIPPED | OUTPATIENT
Start: 2022-06-03 | End: 2022-06-13

## 2022-06-03 RX ORDER — LIDOCAINE HYDROCHLORIDE 10 MG/ML
INJECTION, SOLUTION INFILTRATION; PERINEURAL AS NEEDED
Status: DISCONTINUED | OUTPATIENT
Start: 2022-06-03 | End: 2022-06-03 | Stop reason: SURG

## 2022-06-03 RX ADMIN — SODIUM CHLORIDE, POTASSIUM CHLORIDE, SODIUM LACTATE AND CALCIUM CHLORIDE: 600; 310; 30; 20 INJECTION, SOLUTION INTRAVENOUS at 09:31

## 2022-06-03 RX ADMIN — SODIUM CHLORIDE, POTASSIUM CHLORIDE, SODIUM LACTATE AND CALCIUM CHLORIDE 125 ML/HR: 600; 310; 30; 20 INJECTION, SOLUTION INTRAVENOUS at 08:41

## 2022-06-03 RX ADMIN — FENTANYL CITRATE 50 MCG: 50 INJECTION INTRAMUSCULAR; INTRAVENOUS at 10:19

## 2022-06-03 RX ADMIN — ROCURONIUM BROMIDE 20 MG: 10 SOLUTION INTRAVENOUS at 09:35

## 2022-06-03 RX ADMIN — LIDOCAINE HYDROCHLORIDE 60 MG: 10 INJECTION, SOLUTION INFILTRATION; PERINEURAL at 09:35

## 2022-06-03 RX ADMIN — FENTANYL CITRATE 50 MCG: 50 INJECTION INTRAMUSCULAR; INTRAVENOUS at 10:12

## 2022-06-03 RX ADMIN — FENTANYL CITRATE 50 MCG: 50 INJECTION INTRAMUSCULAR; INTRAVENOUS at 10:00

## 2022-06-03 RX ADMIN — PROPOFOL 150 MG: 10 INJECTION, EMULSION INTRAVENOUS at 09:35

## 2022-06-03 RX ADMIN — SCOPALAMINE 1 PATCH: 1 PATCH, EXTENDED RELEASE TRANSDERMAL at 09:28

## 2022-06-03 RX ADMIN — NEOSTIGMINE 3 MG: 1 INJECTION INTRAVENOUS at 09:55

## 2022-06-03 RX ADMIN — FENTANYL CITRATE 50 MCG: 50 INJECTION INTRAMUSCULAR; INTRAVENOUS at 09:35

## 2022-06-03 RX ADMIN — GLYCOPYRROLATE 0.4 MG: 0.2 INJECTION, SOLUTION INTRAMUSCULAR; INTRAVENOUS at 09:55

## 2022-06-03 RX ADMIN — GLYCOPYRROLATE 0.2 MG: 0.2 INJECTION, SOLUTION INTRAMUSCULAR; INTRAVENOUS at 09:44

## 2022-06-03 RX ADMIN — MIDAZOLAM 2 MG: 1 INJECTION INTRAMUSCULAR; INTRAVENOUS at 09:31

## 2022-06-03 RX ADMIN — FAMOTIDINE 20 MG: 10 INJECTION INTRAVENOUS at 09:31

## 2022-06-03 NOTE — ANESTHESIA PREPROCEDURE EVALUATION
Anesthesia Evaluation     Patient summary reviewed and Nursing notes reviewed   no history of anesthetic complications:  NPO Solid Status: > 8 hours  NPO Liquid Status: > 8 hours           Airway   Mallampati: II  TM distance: >3 FB  Neck ROM: full  Dental    (+) poor dentition        Pulmonary     breath sounds clear to auscultation  (+) asthma,shortness of breath,   Cardiovascular   Exercise tolerance: good (4-7 METS)    Rhythm: regular  Rate: normal    (+) hypertension, valvular problems/murmurs, hyperlipidemia,       Neuro/Psych  (+) psychiatric history Anxiety,    GI/Hepatic/Renal/Endo    (+) obesity,  GERD, PUD,  renal disease,     Musculoskeletal     Abdominal   (+) obese,     Abdomen: soft.   Substance History - negative use     OB/GYN negative ob/gyn ROS         Other   arthritis,                      Anesthesia Plan    ASA 3     general     intravenous induction     Anesthetic plan, all risks, benefits, and alternatives have been provided, discussed and informed consent has been obtained with: patient.  Use of blood products discussed with consented to blood products.   Plan discussed with CRNA.        CODE STATUS:

## 2022-06-03 NOTE — OP NOTE
CHOLECYSTECTOMY LAPAROSCOPIC  Procedure Note    Raúl Vicente  6/3/2022    Pre-op Diagnosis:   Gallstones [K80.20]    Post-op Diagnosis:  same       Procedure(s):  CHOLECYSTECTOMY LAPAROSCOPIC    Surgeon(s):  Saul Maguire MD    Anesthesia: General    Staff:   Circulator: Antonietta Isaac RN  Scrub Person: Aaron Benedict  Assistant: Avis Moyer    Estimated Blood Loss: minimal    Specimens:                Order Name Source Comment Collection Info Order Time   TISSUE EXAM, [facility]) Gallbladder  Collected By: Saul Maguire MD 6/3/2022  9:46 AM     How many specimens?   1          Release to patient   Immediate              Drains: * No LDAs found *    Procedure: The abdomen was prepped and draped. Two 5 mm and one 11 mm port placed. The cystic duct and artery were dissected out, clipped and divided. The gallbladder was taken off the liver with cautery and brought out the upper midline port. It has large stones. The area was irrigated and suctioned and the gas allowed to escape. The ports were removed and sites closed with vicryl.      Findings: gallstones    Complications: none   Grafts / Implants N/A    Saul Maguire MD     Date: 6/3/2022  Time: 09:54 EDT

## 2022-06-03 NOTE — ANESTHESIA POSTPROCEDURE EVALUATION
Patient: Raúl Vicente    Procedure Summary     Date: 06/03/22 Room / Location: [facility] OR 01 /  [facility] OR    Anesthesia Start: 0931 Anesthesia Stop: 1006    Procedure: CHOLECYSTECTOMY LAPAROSCOPIC (N/A Abdomen) Diagnosis:       Gallstones      (Gallstones [K80.20])    Surgeons: Saul Maguire MD Provider: Kory Adan MD    Anesthesia Type: general ASA Status: 3          Anesthesia Type: general    Vitals  Vitals Value Taken Time   /81 06/03/22 1032   Temp 97.3 °F (36.3 °C) 06/03/22 1007   Pulse 60 06/03/22 1032   Resp 12 06/03/22 1032   SpO2 97 % 06/03/22 1032           Post Anesthesia Care and Evaluation    Patient location during evaluation: PACU  Patient participation: complete - patient participated  Level of consciousness: responsive to verbal stimuli  Pain score: 1  Pain management: adequate  Airway patency: patent  Anesthetic complications: No anesthetic complications  PONV Status: none  Cardiovascular status: hemodynamically stable  Respiratory status: nasal cannula  Hydration status: acceptable

## 2022-06-03 NOTE — ANESTHESIA PROCEDURE NOTES
Airway  Urgency: elective    Date/Time: 6/3/2022 9:38 AM  Airway not difficult    General Information and Staff    Patient location during procedure: OR    Indications and Patient Condition  Indications for airway management: airway protection    Preoxygenated: yes  Mask difficulty assessment: 1 - vent by mask    Final Airway Details  Final airway type: endotracheal airway      Successful airway: ETT  Cuffed: yes   Successful intubation technique: direct laryngoscopy  Facilitating devices/methods: intubating stylet and cricoid pressure  Endotracheal tube insertion site: oral  Blade: Daniel  Blade size: 3  ETT size (mm): 7.0  Cormack-Lehane Classification: grade IIa - partial view of glottis  Placement verified by: chest auscultation, capnometry and palpation of cuff   Measured from: lips  ETT/EBT  to lips (cm): 21  Number of attempts at approach: 1  Assessment: lips, teeth, and gum same as pre-op and atraumatic intubation

## 2022-06-07 LAB — REF LAB TEST METHOD: NORMAL

## 2022-06-13 ENCOUNTER — OFFICE VISIT (OUTPATIENT)
Dept: SURGERY | Facility: CLINIC | Age: 67
End: 2022-06-13

## 2022-06-13 VITALS — HEIGHT: 66 IN | BODY MASS INDEX: 32.3 KG/M2 | WEIGHT: 201 LBS

## 2022-06-13 DIAGNOSIS — Z90.49 STATUS POST LAPAROSCOPIC CHOLECYSTECTOMY: Primary | ICD-10-CM

## 2022-06-13 PROCEDURE — 99024 POSTOP FOLLOW-UP VISIT: CPT | Performed by: SURGERY

## 2022-06-13 NOTE — PROGRESS NOTES
Subjective   Raúl Vicente is a 66 y.o. female.     Chief Complaint: post lap ava    History of Present Illness She is doing well post lap ava.     The following portions of the patient's history were reviewed and updated as appropriate: current medications, past family history, past medical history, past social history, past surgical history and problem list.    Review of Systems    Objective   Physical Exam wounds ok,    Past Medical History:   Diagnosis Date   • Asthma    • Blood clot in vein    • Cervical disc syndrome    • Chronic kidney disease     follows with Dr De Los Santos   • Deep vein thrombosis (HCC)    • Depression    • DJD (degenerative joint disease)    • Elevated cholesterol    • Fatty liver    • Gallstones    • GERD (gastroesophageal reflux disease)    • History of repair of hiatal hernia 2016   • Hx of hysterectomy    • Hx of lumpectomy    • Hyperlipidemia    • Hypertension    • PONV (postoperative nausea and vomiting)    • PUD (peptic ulcer disease)        Family History   Problem Relation Age of Onset   • No Known Problems Mother    • Heart disease Father    • Heart disease Brother    • Heart disease Maternal Grandmother    • Hypertension Son        Social History     Tobacco Use   • Smoking status: Former Smoker     Quit date: 3/12/1976     Years since quittin.2   • Smokeless tobacco: Never Used   Vaping Use   • Vaping Use: Never used   Substance Use Topics   • Alcohol use: No   • Drug use: No       Past Surgical History:   Procedure Laterality Date   • ABDOMINAL SURGERY     • BREAST LUMPECTOMY Left    • CARDIOVASCULAR STRESS TEST  2018    R. Stress- 6 Min, 59 Secs. 5.2 METS. 64% THR. BP- 154/61. Inconclusive.   • CARDIOVASCULAR STRESS TEST  2018    L. Cardiolite- Negative. Breast attenuation   • CHOLECYSTECTOMY N/A 6/3/2022    Procedure: CHOLECYSTECTOMY LAPAROSCOPIC;  Surgeon: Saul Maguire MD;  Location: [facility];  Service: General;  Laterality: N/A;   • COLONOSCOPY N/A  05/12/2022    Procedure: COLONOSCOPY;  Surgeon: Saul Maguire MD;  Location: [facility];  Service: Gastroenterology;  Laterality: N/A;   • ECHO - CONVERTED  03/14/2018    EF 65%. RVSP- 21 mmHg   • ENDOSCOPY     • HERNIA REPAIR     • HYSTERECTOMY         Current Outpatient Medications   Medication Instructions   • Eliquis 5 mg, Oral, 2 Times Daily, LAST DOSE 5/29/22 PER DR BLAKE OFFICE   • fluticasone (FLOVENT DISKUS) 50 MCG/BLIST diskus inhaler 1 puff, Inhalation, 2 Times Daily - RT   • HYDROcodone-acetaminophen (NORCO) 5-325 MG per tablet 1 tablet, Oral, Every 6 Hours PRN   • HYDROcodone-acetaminophen (NORCO) 5-325 MG per tablet Take 1 tablet by mouth Every 4 (Four) Hours As Needed for Moderate Pain.   • loratadine (CLARITIN) 10 mg, Oral, Daily   • metoprolol tartrate (LOPRESSOR) 50 mg, Oral, 2 Times Daily   • mometasone-formoterol (DULERA 100) 100-5 MCG/ACT inhaler 2 puffs, Inhalation, 2 Times Daily - RT   • ondansetron (ZOFRAN) 4 mg, Oral, Every 6 Hours PRN   • pantoprazole (PROTONIX) 40 mg, Oral, Daily   • triamterene-hydrochlorothiazide (MAXZIDE-25) 37.5-25 MG per tablet 1 tablet, Oral, Daily   • vitamin D (ERGOCALCIFEROL) 50,000 Units, Oral, Weekly         Assessment & Plan   Diagnoses and all orders for this visit:    1. Status post laparoscopic cholecystectomy (Primary)    return prn

## 2022-08-05 ENCOUNTER — TELEPHONE (OUTPATIENT)
Dept: SURGERY | Facility: CLINIC | Age: 67
End: 2022-08-05

## 2022-08-05 NOTE — TELEPHONE ENCOUNTER
Tried to call patient back. She had left a message asking about her CT scan. Patient was seen by Dr. Maguire and released PRN. There was no answer and no way to leave a message.    TS

## 2023-01-24 ENCOUNTER — OFFICE VISIT (OUTPATIENT)
Dept: SURGERY | Facility: CLINIC | Age: 68
End: 2023-01-24
Payer: MEDICARE

## 2023-01-24 VITALS
SYSTOLIC BLOOD PRESSURE: 109 MMHG | WEIGHT: 193 LBS | HEIGHT: 66 IN | DIASTOLIC BLOOD PRESSURE: 72 MMHG | BODY MASS INDEX: 31.02 KG/M2

## 2023-01-24 DIAGNOSIS — L72.3 SEBACEOUS CYST: Primary | ICD-10-CM

## 2023-01-24 PROCEDURE — 99212 OFFICE O/P EST SF 10 MIN: CPT | Performed by: SURGERY

## 2023-01-24 RX ORDER — ACETAMINOPHEN 500 MG
500 TABLET ORAL EVERY 6 HOURS PRN
COMMUNITY

## 2023-01-24 NOTE — PROGRESS NOTES
Subjective   Raúl Vicente is a 67 y.o. female.     Chief Complaint: sebaceous cyst    History of Present Illness She is a 68 yo who has had a cyst in her sternal area that has had to be drained in the past that is growing and bothering her. She is on eliquis    The following portions of the patient's history were reviewed and updated as appropriate: current medications, past family history, past medical history, past social history, past surgical history and problem list.    Review of Systems    Objective   Physical Exam 1.5 cm cyst in upper sternal area, no redness or drainage    Past Medical History:   Diagnosis Date   • Asthma    • Blood clot in vein    • Cervical disc syndrome    • Chronic kidney disease     follows with Dr De Los Santos   • Deep vein thrombosis (HCC) 2016   • Depression    • DJD (degenerative joint disease)    • Elevated cholesterol    • Fatty liver    • Gallstones    • GERD (gastroesophageal reflux disease)    • History of repair of hiatal hernia    • Hx of hysterectomy    • Hx of lumpectomy    • Hyperlipidemia    • Hypertension    • PONV (postoperative nausea and vomiting)    • PUD (peptic ulcer disease)        Family History   Problem Relation Age of Onset   • No Known Problems Mother    • Heart disease Father    • Heart disease Brother    • Heart disease Maternal Grandmother    • Hypertension Son        Social History     Tobacco Use   • Smoking status: Former     Types: Cigarettes     Quit date: 3/12/1976     Years since quittin.9   • Smokeless tobacco: Never   Vaping Use   • Vaping Use: Never used   Substance Use Topics   • Alcohol use: No   • Drug use: No       Past Surgical History:   Procedure Laterality Date   • ABDOMINAL SURGERY     • BREAST LUMPECTOMY Left    • CARDIOVASCULAR STRESS TEST  2018    R. Stress- 6 Min, 59 Secs. 5.2 METS. 64% THR. BP- 154/61. Inconclusive.   • CARDIOVASCULAR STRESS TEST  2018    L. Cardiolite- Negative. Breast attenuation   • CHOLECYSTECTOMY  N/A 6/3/2022    Procedure: CHOLECYSTECTOMY LAPAROSCOPIC;  Surgeon: Saul Maguire MD;  Location:  [facility];  Service: General;  Laterality: N/A;   • COLONOSCOPY N/A 05/12/2022    Procedure: COLONOSCOPY;  Surgeon: Saul Maguire MD;  Location:  [facility];  Service: Gastroenterology;  Laterality: N/A;   • ECHO - CONVERTED  03/14/2018    EF 65%. RVSP- 21 mmHg   • ENDOSCOPY     • HERNIA REPAIR     • HYSTERECTOMY         Current Outpatient Medications   Medication Instructions   • acetaminophen (TYLENOL) 500 mg, Oral, Every 6 Hours PRN   • Eliquis 5 mg, Oral, 2 Times Daily, LAST DOSE 5/29/22 PER DR BLAKE OFFICE   • fluticasone (FLOVENT DISKUS) 50 MCG/BLIST diskus inhaler 1 puff, Inhalation, 2 Times Daily - RT   • HYDROcodone-acetaminophen (NORCO) 5-325 MG per tablet 1 tablet, Oral, Every 6 Hours PRN   • loratadine (CLARITIN) 10 mg, Oral, Daily   • metoprolol tartrate (LOPRESSOR) 50 mg, Oral, 2 Times Daily   • mometasone-formoterol (DULERA 100) 100-5 MCG/ACT inhaler 2 puffs, Inhalation, 2 Times Daily - RT   • ondansetron (ZOFRAN) 4 mg, Oral, Every 6 Hours PRN   • pantoprazole (PROTONIX) 40 mg, Oral, Daily   • triamterene-hydrochlorothiazide (MAXZIDE-25) 37.5-25 MG per tablet 1 tablet, Oral, Daily   • vitamin D (ERGOCALCIFEROL) 50,000 Units, Oral, Weekly         Assessment & Plan   Diagnoses and all orders for this visit:    1. Sebaceous cyst (Primary)      Excise in the office off eliquis

## 2023-01-26 ENCOUNTER — PROCEDURE VISIT (OUTPATIENT)
Dept: SURGERY | Facility: CLINIC | Age: 68
End: 2023-01-26
Payer: MEDICARE

## 2023-01-26 VITALS — BODY MASS INDEX: 31.02 KG/M2 | WEIGHT: 193 LBS | HEIGHT: 66 IN

## 2023-01-26 DIAGNOSIS — L72.3 SEBACEOUS CYST: Primary | ICD-10-CM

## 2023-01-26 PROCEDURE — 11402 EXC TR-EXT B9+MARG 1.1-2 CM: CPT | Performed by: SURGERY

## 2023-01-26 NOTE — PROGRESS NOTES
Subjective   Raúl Vicente is a 67 y.o. female.     Chief Complaint: sebaceous cyst    History of Present Illness She is a 68 yo who has a growing symptomatic cyst on her sternal area and one on her back.    The following portions of the patient's history were reviewed and updated as appropriate: current medications, past family history, past medical history, past social history, past surgical history and problem list.    Review of Systems    Objective   Physical Exam excised a 1.5 cm cyst on the sternum. And 1.5 cm one on the back with lidocaine and nylon suture.    Past Medical History:   Diagnosis Date   • Asthma    • Blood clot in vein    • Cervical disc syndrome    • Chronic kidney disease     follows with Dr De Los Santos   • Deep vein thrombosis (HCC)    • Depression    • DJD (degenerative joint disease)    • Elevated cholesterol    • Fatty liver    • Gallstones    • GERD (gastroesophageal reflux disease)    • History of repair of hiatal hernia 2016   • Hx of hysterectomy    • Hx of lumpectomy    • Hyperlipidemia    • Hypertension    • PONV (postoperative nausea and vomiting)    • PUD (peptic ulcer disease)        Family History   Problem Relation Age of Onset   • No Known Problems Mother    • Heart disease Father    • Heart disease Brother    • Heart disease Maternal Grandmother    • Hypertension Son        Social History     Tobacco Use   • Smoking status: Former     Types: Cigarettes     Quit date: 3/12/1976     Years since quittin.9   • Smokeless tobacco: Never   Vaping Use   • Vaping Use: Never used   Substance Use Topics   • Alcohol use: No   • Drug use: No       Past Surgical History:   Procedure Laterality Date   • ABDOMINAL SURGERY     • BREAST LUMPECTOMY Left    • CARDIOVASCULAR STRESS TEST  2018    R. Stress- 6 Min, 59 Secs. 5.2 METS. 64% THR. BP- 154/61. Inconclusive.   • CARDIOVASCULAR STRESS TEST  2018    L. Cardiolite- Negative. Breast attenuation   • CHOLECYSTECTOMY N/A 6/3/2022     Procedure: CHOLECYSTECTOMY LAPAROSCOPIC;  Surgeon: Saul Maguire MD;  Location:  [facility];  Service: General;  Laterality: N/A;   • COLONOSCOPY N/A 05/12/2022    Procedure: COLONOSCOPY;  Surgeon: Saul Maguire MD;  Location:  [facility];  Service: Gastroenterology;  Laterality: N/A;   • ECHO - CONVERTED  03/14/2018    EF 65%. RVSP- 21 mmHg   • ENDOSCOPY     • HERNIA REPAIR     • HYSTERECTOMY         Current Outpatient Medications   Medication Instructions   • acetaminophen (TYLENOL) 500 mg, Oral, Every 6 Hours PRN   • Eliquis 5 mg, Oral, 2 Times Daily, LAST DOSE 5/29/22 PER DR BLAKE OFFICE   • fluticasone (FLOVENT DISKUS) 50 MCG/BLIST diskus inhaler 1 puff, Inhalation, 2 Times Daily - RT   • HYDROcodone-acetaminophen (NORCO) 5-325 MG per tablet 1 tablet, Oral, Every 6 Hours PRN   • loratadine (CLARITIN) 10 mg, Oral, Daily   • metoprolol tartrate (LOPRESSOR) 50 mg, Oral, 2 Times Daily   • mometasone-formoterol (DULERA 100) 100-5 MCG/ACT inhaler 2 puffs, Inhalation, 2 Times Daily - RT   • ondansetron (ZOFRAN) 4 mg, Oral, Every 6 Hours PRN   • pantoprazole (PROTONIX) 40 mg, Oral, Daily   • triamterene-hydrochlorothiazide (MAXZIDE-25) 37.5-25 MG per tablet 1 tablet, Oral, Daily   • vitamin D (ERGOCALCIFEROL) 50,000 Units, Oral, Weekly         Assessment & Plan   Diagnoses and all orders for this visit:    1. Sebaceous cyst (Primary)    return 1 wk

## 2023-02-02 ENCOUNTER — OFFICE VISIT (OUTPATIENT)
Dept: SURGERY | Facility: CLINIC | Age: 68
End: 2023-02-02
Payer: MEDICARE

## 2023-02-02 VITALS — BODY MASS INDEX: 31.02 KG/M2 | WEIGHT: 193 LBS | HEIGHT: 66 IN

## 2023-02-02 DIAGNOSIS — L72.3 SEBACEOUS CYST: Primary | ICD-10-CM

## 2023-02-02 PROCEDURE — 99024 POSTOP FOLLOW-UP VISIT: CPT | Performed by: SURGERY

## 2023-02-02 NOTE — PROGRESS NOTES
Subjective   Raúl Vicente is a 67 y.o. female.     Chief Complaint: Post cyst excision    History of Present Illness Her wounds are doing well post cyst excision.    The following portions of the patient's history were reviewed and updated as appropriate: current medications, past family history, past medical history, past social history, past surgical history and problem list.    Review of Systems    Objective   Physical Exam wounds ok, sutures removed    Past Medical History:   Diagnosis Date   • Asthma    • Blood clot in vein    • Cervical disc syndrome    • Chronic kidney disease     follows with Dr De Los Santos   • Deep vein thrombosis (HCC)    • Depression    • DJD (degenerative joint disease)    • Elevated cholesterol    • Fatty liver    • Gallstones    • GERD (gastroesophageal reflux disease)    • History of repair of hiatal hernia    • Hx of hysterectomy    • Hx of lumpectomy    • Hyperlipidemia    • Hypertension    • PONV (postoperative nausea and vomiting)    • PUD (peptic ulcer disease)        Family History   Problem Relation Age of Onset   • No Known Problems Mother    • Heart disease Father    • Heart disease Brother    • Heart disease Maternal Grandmother    • Hypertension Son        Social History     Tobacco Use   • Smoking status: Former     Types: Cigarettes     Quit date: 3/12/1976     Years since quittin.9   • Smokeless tobacco: Never   Vaping Use   • Vaping Use: Never used   Substance Use Topics   • Alcohol use: No   • Drug use: No       Past Surgical History:   Procedure Laterality Date   • ABDOMINAL SURGERY     • BREAST LUMPECTOMY Left    • CARDIOVASCULAR STRESS TEST  2018    R. Stress- 6 Min, 59 Secs. 5.2 METS. 64% THR. BP- 154/61. Inconclusive.   • CARDIOVASCULAR STRESS TEST  2018    L. Cardiolite- Negative. Breast attenuation   • CHOLECYSTECTOMY N/A 6/3/2022    Procedure: CHOLECYSTECTOMY LAPAROSCOPIC;  Surgeon: Saul Maguire MD;  Location: [facility];  Service: General;   Laterality: N/A;   • COLONOSCOPY N/A 05/12/2022    Procedure: COLONOSCOPY;  Surgeon: Saul Maguire MD;  Location: [facility];  Service: Gastroenterology;  Laterality: N/A;   • ECHO - CONVERTED  03/14/2018    EF 65%. RVSP- 21 mmHg   • ENDOSCOPY     • HERNIA REPAIR     • HYSTERECTOMY         Current Outpatient Medications   Medication Instructions   • acetaminophen (TYLENOL) 500 mg, Oral, Every 6 Hours PRN   • Eliquis 5 mg, Oral, 2 Times Daily, LAST DOSE 5/29/22 PER DR BLAKE OFFICE   • fluticasone (FLOVENT DISKUS) 50 MCG/BLIST diskus inhaler 1 puff, Inhalation, 2 Times Daily - RT   • HYDROcodone-acetaminophen (NORCO) 5-325 MG per tablet 1 tablet, Oral, Every 6 Hours PRN   • loratadine (CLARITIN) 10 mg, Oral, Daily   • metoprolol tartrate (LOPRESSOR) 50 mg, Oral, 2 Times Daily   • mometasone-formoterol (DULERA 100) 100-5 MCG/ACT inhaler 2 puffs, Inhalation, 2 Times Daily - RT   • ondansetron (ZOFRAN) 4 mg, Oral, Every 6 Hours PRN   • pantoprazole (PROTONIX) 40 mg, Oral, Daily   • triamterene-hydrochlorothiazide (MAXZIDE-25) 37.5-25 MG per tablet 1 tablet, Oral, Daily   • vitamin D (ERGOCALCIFEROL) 50,000 Units, Oral, Weekly         Assessment & Plan   Diagnoses and all orders for this visit:    1. Sebaceous cyst (Primary)    return prn

## (undated) DEVICE — MONOPOLAR METZENBAUM SCISSOR TIP, DISPOSABLE: Brand: MONOPOLAR METZENBAUM SCISSOR TIP, DISPOSABLE

## (undated) DEVICE — CONN Y IRR DISP 1P/U

## (undated) DEVICE — ENDOGATOR AUXILIARY WATER JET CONNECTOR: Brand: ENDOGATOR

## (undated) DEVICE — TROCAR: Brand: KII® SLEEVE

## (undated) DEVICE — DRAPE,UTILTY,TAPE,15X26, 4EA/PK: Brand: MEDLINE

## (undated) DEVICE — PK LAP GEN 70

## (undated) DEVICE — ENDOGATOR TUBING FOR ENDOGATOR EGP-100 IRRIGATION PUMP,OLYMPUS OFP PUMP, OLYMPUS AFU-100 PUMP AND ERBE EIP2 PUMP: Brand: ENDOGATOR

## (undated) DEVICE — TUBING, SUCTION, 1/4" X 20', STRAIGHT: Brand: MEDLINE INDUSTRIES, INC.

## (undated) DEVICE — SUT VIC 2/0 UR6 27IN J602H

## (undated) DEVICE — HOLDER: Brand: DEROYAL

## (undated) DEVICE — CYSTO/BLADDER IRRIGATION SET, REGULATING CLAMP

## (undated) DEVICE — ST TBG PNEUMOCLEAR EVAC SMOKE HIFLO

## (undated) DEVICE — APPL CHLORAPREP HI/LITE 26ML ORNG

## (undated) DEVICE — ENDOPATH XCEL BLADELESS TROCARS WITH STABILITY SLEEVES: Brand: ENDOPATH XCEL

## (undated) DEVICE — ENDOPATH ELECTROSURGERY PROBE PLUS II PISTOL HAND CONTROL PISTOL GRIP HANDLES WITH SUCTION AND IRRRIGATION FOR HAND CONTROL MONOPOLAR ELCTROSURGERY USE ONLY WITH PROBE PLUS II SHAFTS: Brand: ENDOPATH

## (undated) DEVICE — ENDOPATH ELECTROSURGERY PROBE PLUS II 5MM RIGHT ANGLE MONOPOLAR ELECTROSURGERY SUCTION AND IRRRIGATION SHAFTS WITH RIGHT ANGLE ELECTRODE - USE ONLY WITH PROBE PLUS II HANDLES: Brand: ENDOPATH

## (undated) DEVICE — PATIENT RETURN ELECTRODE, SINGLE-USE, CONTACT QUALITY MONITORING, ADULT, WITH 9FT CORD, FOR PATIENTS WEIGING OVER 33LBS. (15KG): Brand: MEGADYNE

## (undated) DEVICE — TROCAR: Brand: KII FIOS FIRST ENTRY

## (undated) DEVICE — GLV SURG PREMIERPRO MIC LTX PF SZ7.5 BRN

## (undated) DEVICE — Device

## (undated) DEVICE — SYR LUERLOK 30CC

## (undated) DEVICE — Device: Brand: DEFENDO AIR/WATER/SUCTION AND BIOPSY VALVE

## (undated) DEVICE — ANTIBACTERIAL UNDYED BRAIDED (POLYGLACTIN 910), SYNTHETIC ABSORBABLE SUTURE: Brand: COATED VICRYL